# Patient Record
Sex: MALE | Race: WHITE
[De-identification: names, ages, dates, MRNs, and addresses within clinical notes are randomized per-mention and may not be internally consistent; named-entity substitution may affect disease eponyms.]

---

## 2017-01-28 ENCOUNTER — HOSPITAL ENCOUNTER (EMERGENCY)
Dept: HOSPITAL 17 - NEPA | Age: 27
Discharge: HOME | End: 2017-01-28
Payer: SELF-PAY

## 2017-01-28 VITALS
HEART RATE: 68 BPM | RESPIRATION RATE: 17 BRPM | OXYGEN SATURATION: 100 % | DIASTOLIC BLOOD PRESSURE: 57 MMHG | SYSTOLIC BLOOD PRESSURE: 108 MMHG

## 2017-01-28 VITALS
SYSTOLIC BLOOD PRESSURE: 131 MMHG | RESPIRATION RATE: 16 BRPM | TEMPERATURE: 98.4 F | HEART RATE: 87 BPM | OXYGEN SATURATION: 100 % | DIASTOLIC BLOOD PRESSURE: 80 MMHG

## 2017-01-28 VITALS — HEIGHT: 66 IN | WEIGHT: 143.3 LBS | BODY MASS INDEX: 23.03 KG/M2

## 2017-01-28 DIAGNOSIS — K29.01: Primary | ICD-10-CM

## 2017-01-28 LAB
ALP SERPL-CCNC: 85 U/L (ref 45–117)
ALT SERPL-CCNC: 187 U/L (ref 12–78)
ANION GAP SERPL CALC-SCNC: 6 MEQ/L (ref 5–15)
APTT BLD: 28.6 SEC (ref 24.3–30.1)
AST SERPL-CCNC: 41 U/L (ref 15–37)
BASOPHILS # BLD AUTO: 0.1 TH/MM3 (ref 0–0.2)
BASOPHILS NFR BLD: 1.7 % (ref 0–2)
BILIRUB SERPL-MCNC: 0.4 MG/DL (ref 0.2–1)
BUN SERPL-MCNC: 11 MG/DL (ref 7–18)
CHLORIDE SERPL-SCNC: 108 MEQ/L (ref 98–107)
EOSINOPHIL # BLD: 0 TH/MM3 (ref 0–0.4)
EOSINOPHIL NFR BLD: 0.5 % (ref 0–4)
ERYTHROCYTE [DISTWIDTH] IN BLOOD BY AUTOMATED COUNT: 13.4 % (ref 11.6–17.2)
GFR SERPLBLD BASED ON 1.73 SQ M-ARVRAT: 135 ML/MIN (ref 89–?)
HCO3 BLD-SCNC: 27.1 MEQ/L (ref 21–32)
HCT VFR BLD CALC: 40.8 % (ref 39–51)
HEMO FLAGS: (no result)
INR PPP: 1.1 RATIO
LYMPHOCYTES # BLD AUTO: 0.6 TH/MM3 (ref 1–4.8)
LYMPHOCYTES NFR BLD AUTO: 13 % (ref 9–44)
MCH RBC QN AUTO: 33.3 PG (ref 27–34)
MCHC RBC AUTO-ENTMCNC: 34.8 % (ref 32–36)
MCV RBC AUTO: 95.5 FL (ref 80–100)
MONOCYTES NFR BLD: 8 % (ref 0–8)
NEUTROPHILS # BLD AUTO: 3.6 TH/MM3 (ref 1.8–7.7)
NEUTROPHILS NFR BLD AUTO: 76.8 % (ref 16–70)
PLATELET # BLD: 127 TH/MM3 (ref 150–450)
POTASSIUM SERPL-SCNC: 4.2 MEQ/L (ref 3.5–5.1)
PROTHROMBIN TIME: 11.7 SEC (ref 9.8–11.6)
RBC # BLD AUTO: 4.27 MIL/MM3 (ref 4.5–5.9)
SODIUM SERPL-SCNC: 141 MEQ/L (ref 136–145)
WBC # BLD AUTO: 4.7 TH/MM3 (ref 4–11)

## 2017-01-28 PROCEDURE — C9113 INJ PANTOPRAZOLE SODIUM, VIA: HCPCS

## 2017-01-28 PROCEDURE — 96374 THER/PROPH/DIAG INJ IV PUSH: CPT

## 2017-01-28 PROCEDURE — 85025 COMPLETE CBC W/AUTO DIFF WBC: CPT

## 2017-01-28 PROCEDURE — 99284 EMERGENCY DEPT VISIT MOD MDM: CPT

## 2017-01-28 PROCEDURE — 74177 CT ABD & PELVIS W/CONTRAST: CPT

## 2017-01-28 PROCEDURE — 83690 ASSAY OF LIPASE: CPT

## 2017-01-28 PROCEDURE — 85730 THROMBOPLASTIN TIME PARTIAL: CPT

## 2017-01-28 PROCEDURE — 80053 COMPREHEN METABOLIC PANEL: CPT

## 2017-01-28 PROCEDURE — 96375 TX/PRO/DX INJ NEW DRUG ADDON: CPT

## 2017-01-28 PROCEDURE — 85610 PROTHROMBIN TIME: CPT

## 2017-01-28 RX ADMIN — SODIUM CHLORIDE, PRESERVATIVE FREE PRN ML: 5 INJECTION INTRAVENOUS at 12:31

## 2017-01-28 RX ADMIN — SODIUM CHLORIDE, PRESERVATIVE FREE PRN ML: 5 INJECTION INTRAVENOUS at 14:50

## 2017-01-28 NOTE — PD
HPI


Chief Complaint:  vomiting blood


Time Seen by Provider:  12:10


Travel History


International Travel<30 days:  No


Contact w/Intl Traveler<30days:  No


Traveled to known affect area:  No





History of Present Illness


HPI


26-year-old male with history of pancreatitis, bipolar disorder, status post 

appendectomy and cholecystectomy, presents to the ER today because he states 

that he has been having nausea, vomiting, vomiting small amounts of blood this 

morning.  He also complains of abdominal pain.  He denies any fevers, diarrhea, 

or any other issues.





Modifying Factors: None


Associated Signs & Symptoms: Nausea, vomiting, abdominal pain


Risk factors: Pancreatitis





PFSH


Past Medical History


ADHD:  Yes


Asthma:  No


Blood Disorders:  No


Bipolar Disorder:  Yes


Heart Rhythm Problems:  No


Cancer:  No


Cardiovascular Problems:  No


High Cholesterol:  No


Chemotherapy:  No


Chest Pain:  No


Congestive Heart Failure:  No


COPD:  No


Diabetes:  No


Diminished Hearing:  No


Gastrointestinal Disorders:  No


Genitourinary:  No


Hiatal Hernia:  Yes


Hypertension:  No


Immune Disorder:  No


Inguinal Hernia:  Yes


Implanted Vascular Access Dvce:  No


Musculoskeletal:  No


Neurologic:  No


Psychiatric:  Yes (HISTORY OF REPORTED BIPOLAR DISORDER)


Reproductive:  No


Respiratory:  No


Immunizations Current:  No


Pancreatitis:  Yes


Radiation Therapy:  No


Sleep Apnea:  No


Thyroid Disease:  No





Past Surgical History


Appendectomy:  Yes


Cholecystectomy:  Yes


Genitourinary Surgery:  Yes (HERNIA REPAIR)


Other Surgery:  Yes (APPENDECTOMY)





Social History


Alcohol Use:  Yes (bacardi gold)


Tobacco Use:  Yes (1/2 ppd)


Substance Use:  Yes (MARIJUANA)





Allergies-Medications


(Allergen,Severity, Reaction):  


Coded Allergies:  


     No Known Allergies (Unverified , 12/25/16)


Reported Meds & Prescriptions





Reported Meds & Active Scripts


Active


No Active Prescriptions or Reported Medications    








Review of Systems


Except as stated in HPI:  all other systems reviewed are Neg





Physical Exam


Narrative


GENERAL: Well-nourished, well-developed young male patient in mild distress.


SKIN: Warm and dry.


HEAD: Normocephalic.


EYES: No scleral icterus. No injection or drainage. 


NECK: Supple, trachea midline. 


CARDIOVASCULAR: Regular rate and rhythm without murmurs, gallops, or rubs. 


RESPIRATORY: Breath sounds equal bilaterally. No accessory muscle use.


GASTROINTESTINAL: Abdomen soft, diffuse abdominal tenderness without guarding 

or rebound, nondistended. 


MUSCULOSKELETAL: No cyanosis, or edema. 


BACK: Nontender without obvious deformity. No CVA tenderness.





Data


Data


Last Documented VS








Vital Signs








  Date Time  Temp Pulse Resp B/P Pulse Ox O2 Delivery O2 Flow Rate FiO2


 


1/28/17 15:33  68 17 108/57 100 Room Air  


 


1/28/17 12:14 98.4       











Orders





 Complete Blood Count With Diff (1/28/17 12:10)


Comprehensive Metabolic Panel (1/28/17 12:10)


Lipase (1/28/17 12:10)


Prothrombin Time / Inr (Pt) (1/28/17 12:10)


Act Partial Throm Time (Ptt) (1/28/17 12:10)


Ct Abd/Pel W Iv Contrast(Rout) (1/28/17 12:10)


Iv Access Insert/Monitor (1/28/17 12:10)


Ecg Monitoring (1/28/17 12:10)


Oximetry (1/28/17 12:10)


NPO (1/28/17 12:10)


Ondansetron Inj (Zofran Inj) (1/28/17 12:15)


Pantoprazole Inj (Protonix Inj) (1/28/17 12:15)


Sodium Chlor 0.9% 1000 Ml Inj (Ns 1000 M (1/28/17 12:10)


Sodium Chloride 0.9% Flush (Ns Flush) (1/28/17 12:15)


Morphine Inj (Morphine Inj) (1/28/17 13:00)





Labs








 Laboratory Tests








Test 1/28/17





 12:15


 


White Blood Count 4.7 TH/MM3


 


Red Blood Count 4.27 MIL/MM3


 


Hemoglobin 14.2 GM/DL


 


Hematocrit 40.8 %


 


Mean Corpuscular Volume 95.5 FL


 


Mean Corpuscular Hemoglobin 33.3 PG


 


Mean Corpuscular Hemoglobin 34.8 %





Concent 


 


Red Cell Distribution Width 13.4 %


 


Platelet Count 127 TH/MM3


 


Mean Platelet Volume 8.5 FL


 


Neutrophils (%) (Auto) 76.8 %


 


Lymphocytes (%) (Auto) 13.0 %


 


Monocytes (%) (Auto) 8.0 %


 


Eosinophils (%) (Auto) 0.5 %


 


Basophils (%) (Auto) 1.7 %


 


Neutrophils # (Auto) 3.6 TH/MM3


 


Lymphocytes # (Auto) 0.6 TH/MM3


 


Monocytes # (Auto) 0.4 TH/MM3


 


Eosinophils # (Auto) 0.0 TH/MM3


 


Basophils # (Auto) 0.1 TH/MM3


 


CBC Comment DIFF FINAL 


 


Differential Comment  


 


Prothrombin Time 11.7 SEC


 


Prothromb Time International 1.1 RATIO





Ratio 


 


Activated Partial 28.6 SEC





Thromboplast Time 


 


Sodium Level 141 MEQ/L


 


Potassium Level 4.2 MEQ/L


 


Chloride Level 108 MEQ/L


 


Carbon Dioxide Level 27.1 MEQ/L


 


Anion Gap 6 MEQ/L


 


Blood Urea Nitrogen 11 MG/DL


 


Creatinine 0.71 MG/DL


 


Estimat Glomerular Filtration 135 ML/MIN





Rate 


 


Random Glucose 90 MG/DL


 


Calcium Level 8.6 MG/DL


 


Total Bilirubin 0.4 MG/DL


 


Aspartate Amino Transf 41 U/L





(AST/SGOT) 


 


Alanine Aminotransferase 187 U/L





(ALT/SGPT) 


 


Alkaline Phosphatase 85 U/L


 


Total Protein 7.5 GM/DL


 


Albumin 4.1 GM/DL


 


Lipase 74 U/L














[facility]


Medical Decision Making


Medical Screen Exam Complete:  Yes


Emergency Medical Condition:  Yes


Medical Record Reviewed:  Yes


Interpretation(s)





Laboratory Tests








Test 1/28/17





 12:15


 


Red Blood Count 4.27 MIL/MM3





 (4.50-5.90)


 


Platelet Count 127 TH/MM3





 (150-450)


 


Neutrophils (%) (Auto) 76.8 %





 (16.0-70.0)


 


Lymphocytes # (Auto) 0.6 TH/MM3





 (1.0-4.8)


 


Prothrombin Time 11.7 SEC





 (9.8-11.6)


 


Chloride Level 108 MEQ/L





 ()


 


Aspartate Amino Transf 41 U/L (15-37)





(AST/SGOT) 


 


Alanine Aminotransferase 187 U/L (12-78)





(ALT/SGPT) 








Differential Diagnosis


Nausea, vomiting, abdominal pains, vomiting small amounts of bloodgastritis 

versus gastroenteritis versus pancreatitis versus GI bleed


Narrative Course


Lab work did not show significant signs of dehydration, anemia, or significant 

metabolic issues.  CAT scan did not show any signs of acute intra-abdominal 

processes.  At this point, suspect gastritis as the cause of current symptoms.  

Patient states that he vomited once up small amounts of blood at a time.  At 

this point, patient had been given IV fluids, anti-medics, pain medications, 

and is doing well in the ER on reevaluation at 3:30.  At this point, my plan 

would be to release the patient with follow-up to primary care doctor.  Return 

for any worsening in symptoms as necessary.  The plan was discussed with him 

and he states understanding.  He should return for any worsening in vomiting, 

bleeding, and as needed.





Diagnosis





 Primary Impression:  


 ACUTE GASTRITIS WITH BLEEDING


***Med/Other Pt SpecificInfo:  Prescription(s) given


Scripts


Ondansetron Odt (Zofran Odt)4 Mg Tab4 Mg SL Q6HR PRN (Nausea/Vomiting) #7 TAB  

Ref 0


   Prov:Dereje Fernandes MD         1/28/17 


Ranitidine (Zantac)150 Mg Mma995 Mg PO BID  #20 TAB  Ref 0


   Prov:Dereje Fernandes MD         1/28/17


Disposition:  01 DISCHARGE HOME


Condition:  Stable








Dereje Fernandes MD Jan 28, 2017 12:13

## 2017-01-28 NOTE — RADRPT
EXAM DATE/TIME:  01/28/2017 14:57 

 

[facility] COMPARISON:     

CT ABDOMEN & PELVIS W CONTRAST, June 05, 2015, 15:22.

 

 

INDICATIONS :     

Vomiting and abdominal pain for four days.

                      

 

IV CONTRAST:     

95 cc Omnipaque 350 (iohexol) IV 

 

 

ORAL CONTRAST:      

No oral contrast ingested.

                      

 

RADIATION DOSE:     

9.96 CTDIvol (mGy) 

 

 

MEDICAL HISTORY :     

Pancreatitis. Hernia, hiatal. Hernia, inguinal.

 

SURGICAL HISTORY :      

Appendectomy. Cholecystectomy.

 

ENCOUNTER:      

Initial

 

ACUITY:      

4 - 6 days

 

PAIN SCALE:      

7/10

 

LOCATION:         

abdomen/pelvis

 

TECHNIQUE:     

Volumetric scanning of the abdomen and pelvis was performed.  Using automated exposure control and ad
justment of the mA and/or kV according to patient size, radiation dose was kept as low as reasonably 
achievable to obtain optimal diagnostic quality images. 

 

FINDINGS:     

 

LOWER LUNGS:     

The visualized lower lungs are clear.

 

LIVER:     

Homogeneous density without lesion.  There is no dilation of the biliary tree.  The common bile duct 
measures 8 mm.  Hemoclips in the martin from prior cholecystectomy.

 

SPLEEN:     

Normal size without lesion.

 

PANCREAS:     

Within normal limits.

 

KIDNEYS:     

Normal in size and shape.  There is no mass, stone or hydronephrosis.

 

ADRENAL GLANDS:     

Within normal limits.

 

VASCULAR:     

There is no aortic aneurysm.

 

BOWEL/MESENTERY:     

No dilated loops of small large bowel.  A mild amount of stool in the right colon.  No evidence of fr
ee fluid.

 

ABDOMINAL WALL:     

Within normal limits.

 

RETROPERITONEUM:     

There is no lymphadenopathy. 

 

BLADDER:     

No wall thickening or mass. 

 

REPRODUCTIVE:     

Within normal limits.

 

INGUINAL:     

There is no lymphadenopathy or hernia. 

 

MUSCULOSKELETAL:     

Within normal limits for patient age. 

 

CONCLUSION:     

Negative CT abdomen/pelvis with contrast.  Prior cholecystectomy with the common bile duct upper limi
ts normal in dimension.

 

 

 

 Elliot Amanda MD on January 28, 2017 at 15:31           

Board Certified Radiologist.

 This report was verified electronically.

## 2017-04-25 ENCOUNTER — HOSPITAL ENCOUNTER (OUTPATIENT)
Dept: HOSPITAL 17 - NEPD | Age: 27
Setting detail: OBSERVATION
LOS: 3 days | Discharge: HOME | End: 2017-04-28
Attending: HOSPITALIST | Admitting: HOSPITALIST
Payer: SELF-PAY

## 2017-04-25 VITALS — BODY MASS INDEX: 21.45 KG/M2 | HEIGHT: 67 IN | WEIGHT: 136.69 LBS

## 2017-04-25 VITALS
SYSTOLIC BLOOD PRESSURE: 120 MMHG | RESPIRATION RATE: 16 BRPM | DIASTOLIC BLOOD PRESSURE: 75 MMHG | HEART RATE: 71 BPM | OXYGEN SATURATION: 98 %

## 2017-04-25 VITALS
DIASTOLIC BLOOD PRESSURE: 69 MMHG | SYSTOLIC BLOOD PRESSURE: 155 MMHG | RESPIRATION RATE: 15 BRPM | OXYGEN SATURATION: 100 % | TEMPERATURE: 97.9 F | HEART RATE: 73 BPM

## 2017-04-25 VITALS
RESPIRATION RATE: 16 BRPM | OXYGEN SATURATION: 98 % | HEART RATE: 64 BPM | SYSTOLIC BLOOD PRESSURE: 125 MMHG | DIASTOLIC BLOOD PRESSURE: 76 MMHG

## 2017-04-25 DIAGNOSIS — J02.9: ICD-10-CM

## 2017-04-25 DIAGNOSIS — R74.8: ICD-10-CM

## 2017-04-25 DIAGNOSIS — F90.9: ICD-10-CM

## 2017-04-25 DIAGNOSIS — F31.9: ICD-10-CM

## 2017-04-25 DIAGNOSIS — F17.200: ICD-10-CM

## 2017-04-25 DIAGNOSIS — Z90.49: ICD-10-CM

## 2017-04-25 DIAGNOSIS — R74.0: ICD-10-CM

## 2017-04-25 DIAGNOSIS — F10.10: ICD-10-CM

## 2017-04-25 DIAGNOSIS — K86.1: ICD-10-CM

## 2017-04-25 DIAGNOSIS — K29.50: Primary | ICD-10-CM

## 2017-04-25 LAB
ALP SERPL-CCNC: 83 U/L (ref 45–117)
ALT SERPL-CCNC: 99 U/L (ref 12–78)
ANION GAP SERPL CALC-SCNC: 7 MEQ/L (ref 5–15)
APTT BLD: 29 SEC (ref 24.3–30.1)
AST SERPL-CCNC: 167 U/L (ref 15–37)
BASOPHILS # BLD AUTO: 0 TH/MM3 (ref 0–0.2)
BASOPHILS NFR BLD: 0.4 % (ref 0–2)
BILIRUB SERPL-MCNC: 1.4 MG/DL (ref 0.2–1)
BUN SERPL-MCNC: 12 MG/DL (ref 7–18)
CHLORIDE SERPL-SCNC: 105 MEQ/L (ref 98–107)
EOSINOPHIL # BLD: 0 TH/MM3 (ref 0–0.4)
EOSINOPHIL NFR BLD: 0.5 % (ref 0–4)
ERYTHROCYTE [DISTWIDTH] IN BLOOD BY AUTOMATED COUNT: 13.2 % (ref 11.6–17.2)
GFR SERPLBLD BASED ON 1.73 SQ M-ARVRAT: 145 ML/MIN (ref 89–?)
HCO3 BLD-SCNC: 27.5 MEQ/L (ref 21–32)
HCT VFR BLD CALC: 40.9 % (ref 39–51)
HEMO FLAGS: (no result)
INR PPP: 1.1 RATIO
LYMPHOCYTES # BLD AUTO: 1.9 TH/MM3 (ref 1–4.8)
LYMPHOCYTES NFR BLD AUTO: 21.5 % (ref 9–44)
MAGNESIUM SERPL-MCNC: 2.1 MG/DL (ref 1.5–2.5)
MCH RBC QN AUTO: 31.8 PG (ref 27–34)
MCHC RBC AUTO-ENTMCNC: 33.5 % (ref 32–36)
MCV RBC AUTO: 94.7 FL (ref 80–100)
MONOCYTES NFR BLD: 12 % (ref 0–8)
NEUTROPHILS # BLD AUTO: 5.9 TH/MM3 (ref 1.8–7.7)
NEUTROPHILS NFR BLD AUTO: 65.6 % (ref 16–70)
PLATELET # BLD: 146 TH/MM3 (ref 150–450)
POTASSIUM SERPL-SCNC: 3.8 MEQ/L (ref 3.5–5.1)
PROTHROMBIN TIME: 12.2 SEC (ref 9.8–11.6)
RBC # BLD AUTO: 4.32 MIL/MM3 (ref 4.5–5.9)
SODIUM SERPL-SCNC: 139 MEQ/L (ref 136–145)
WBC # BLD AUTO: 9 TH/MM3 (ref 4–11)

## 2017-04-25 PROCEDURE — 84484 ASSAY OF TROPONIN QUANT: CPT

## 2017-04-25 PROCEDURE — 80074 ACUTE HEPATITIS PANEL: CPT

## 2017-04-25 PROCEDURE — 96374 THER/PROPH/DIAG INJ IV PUSH: CPT

## 2017-04-25 PROCEDURE — 83690 ASSAY OF LIPASE: CPT

## 2017-04-25 PROCEDURE — 88305 TISSUE EXAM BY PATHOLOGIST: CPT

## 2017-04-25 PROCEDURE — G0378 HOSPITAL OBSERVATION PER HR: HCPCS

## 2017-04-25 PROCEDURE — 80076 HEPATIC FUNCTION PANEL: CPT

## 2017-04-25 PROCEDURE — 80307 DRUG TEST PRSMV CHEM ANLYZR: CPT

## 2017-04-25 PROCEDURE — 85730 THROMBOPLASTIN TIME PARTIAL: CPT

## 2017-04-25 PROCEDURE — C9113 INJ PANTOPRAZOLE SODIUM, VIA: HCPCS

## 2017-04-25 PROCEDURE — 71010: CPT

## 2017-04-25 PROCEDURE — 88312 SPECIAL STAINS GROUP 1: CPT

## 2017-04-25 PROCEDURE — 99285 EMERGENCY DEPT VISIT HI MDM: CPT

## 2017-04-25 PROCEDURE — 74177 CT ABD & PELVIS W/CONTRAST: CPT

## 2017-04-25 PROCEDURE — 80053 COMPREHEN METABOLIC PANEL: CPT

## 2017-04-25 PROCEDURE — 96375 TX/PRO/DX INJ NEW DRUG ADDON: CPT

## 2017-04-25 PROCEDURE — 96361 HYDRATE IV INFUSION ADD-ON: CPT

## 2017-04-25 PROCEDURE — 43239 EGD BIOPSY SINGLE/MULTIPLE: CPT

## 2017-04-25 PROCEDURE — 85025 COMPLETE CBC W/AUTO DIFF WBC: CPT

## 2017-04-25 PROCEDURE — 85610 PROTHROMBIN TIME: CPT

## 2017-04-25 PROCEDURE — 83735 ASSAY OF MAGNESIUM: CPT

## 2017-04-25 RX ADMIN — NICOTINE SCH PATCH: 14 PATCH, EXTENDED RELEASE TOPICAL at 21:39

## 2017-04-25 RX ADMIN — PHENYTOIN SODIUM SCH MLS/HR: 50 INJECTION INTRAMUSCULAR; INTRAVENOUS at 20:23

## 2017-04-25 RX ADMIN — HYDROMORPHONE HYDROCHLORIDE PRN MG: 1 INJECTION, SOLUTION INTRAMUSCULAR; INTRAVENOUS; SUBCUTANEOUS at 21:18

## 2017-04-25 RX ADMIN — Medication SCH ML: at 20:51

## 2017-04-25 NOTE — HHI.HP
__________________________________________________





HPI


Service


[facility]


Primary Care Physician


No Primary Care Physician


Admission Diagnosis


Pancreatitis


Diagnoses:  


Chief Complaint:  


abdominal pain


Travel History


International Travel<30 Days:  No


Contact w/Intl Traveler <30 Da:  No


Traveled to Known Affected Are:  No


History of Present Illness


This is a 26 year old male patient with a past medical history which includes 

ADHD, bipolar, gastritis and chronic pancreatitis.  Patient was in his normal 

state of anuja around 11:30 he was walking and ran over the train tracks.  

Bety in the day around 1200pm patient began to have severe epigastric pain 

which radiated to his chest associated with intermitted nausea and dizziness 

without vomiting.  Patient also reports subjective intermitted fevers x 1 day 

with associated hot and cold flashes.  Reports constipated last BM 1.5 days 

ago.  Patient denies dysuria, penile discharge, open sores, shortness of breath

, black tarry stools or BRBPR.  


Patient reports he rarely drinks alcohol last drink was 2-3 days ago.  Patient 

also reports that this feels similar to his prior pancreatitis episodes but 

feels as though this is worse than before.  Pain improved with IV morphine.





Review of Systems


Except as stated in HPI:  all other systems reviewed are Neg





Past Family Social History


Past Medical History


AHHD, bipolar, gastritis and chronic pancreatitis


Past Surgical History


appendectomy, hernia repair as a baby, cholecystectomy


Reported Medications


denies taking medications on a daily bases


Allergies:  


Coded Allergies:  


     No Known Allergies (Unverified , 4/25/17)


Active Ordered Medications





 Current Medications








 Medications


  (Trade)  Dose


 Ordered  Sig/Alex


 Route  Start Time


 Stop Time Status Last Admin


 


  (NS 1000 ml Inj)  1,000 ml @ 


 250 mls/hr  Q4H


 IV  4/25/17 19:57


    4/25/17 20:23


 


 


  (NS Flush)  2 ml  UNSCH  PRN


 IV FLUSH  4/25/17 20:00


     


 


 


  (NS Flush)  2 ml  BID


 IV FLUSH  4/25/17 21:00


     


 


 


  (Zofran Inj)  4 mg  Q6H  PRN


 IVP  4/25/17 20:00


     


 


 


  (Narcan Inj)  0.4 mg  UNSCH  PRN


 IV  4/25/17 20:00


     


 


 


  (Dilaudid Pf Inj)  1 mg  Q3HR  PRN


 IV PUSH  4/25/17 20:15


     


 








Family History


Mother had an MI at age 50


Social History


ETOH occasionally on special occasions- last drink 2-3 days ago


tobacco use 0.5 PPD


marijuana occasionally





Physical Exam


Vital Signs





 Vital Signs








  Date Time  Temp Pulse Resp B/P Pulse Ox O2 Delivery O2 Flow Rate FiO2


 


4/25/17 19:45  71 16 120/75 98 Room Air  


 


4/25/17 18:13     98 Room Air  


 


4/25/17 18:13  64 16 125/76 98 Room Air  


 


4/25/17 18:13      Room Air  


 


4/25/17 17:26 97.9 73 15 155/69 100   








Physical Exam


GENERAL: This is a well-nourished, well-developed patient, appears 

uncomfortable but in no apparent distress.


SKIN: No rashes, ecchymoses or lesions. Cool and dry.


HEAD: Atraumatic. Normocephalic. No temporal or scalp tenderness.


EYES: Extraocular motions intact. No scleral icterus. No injection or drainage. 


CARDIOVASCULAR: Regular rate and rhythm without murmurs, gallops, or rubs. 


RESPIRATORY: Clear to auscultation. Breath sounds equal bilaterally. No wheezes

, rales, or rhonchi.  


GASTROINTESTINAL: Abdomen soft, tender to light palpation, nondistended. 


MUSCULOSKELETAL: Extremities without clubbing, cyanosis, or edema. No joint 

tenderness, effusion, or edema noted. No calf tenderness. Negative Homans sign 

bilaterally.


NEUROLOGICAL: Awake and alert.  Motor and sensory grossly within normal limits. 

Five out of 5 muscle strength in all muscle groups.  Normal speech.


Laboratory





Laboratory Tests








Test 4/25/17 4/25/17





 17:25 17:52


 


Prothrombin Time 12.2  


 


Prothromb Time International 1.1  





Ratio  


 


Activated Partial 29.0  





Thromboplast Time  


 


White Blood Count  9.0 


 


Red Blood Count  4.32 


 


Hemoglobin  13.7 


 


Hematocrit  40.9 


 


Mean Corpuscular Volume  94.7 


 


Mean Corpuscular Hemoglobin  31.8 


 


Mean Corpuscular Hemoglobin  33.5 





Concent  


 


Red Cell Distribution Width  13.2 


 


Platelet Count  146 


 


Mean Platelet Volume  8.3 


 


Neutrophils (%) (Auto)  65.6 


 


Lymphocytes (%) (Auto)  21.5 


 


Monocytes (%) (Auto)  12.0 


 


Eosinophils (%) (Auto)  0.5 


 


Basophils (%) (Auto)  0.4 


 


Neutrophils # (Auto)  5.9 


 


Lymphocytes # (Auto)  1.9 


 


Monocytes # (Auto)  1.1 


 


Eosinophils # (Auto)  0.0 


 


Basophils # (Auto)  0.0 


 


CBC Comment  DIFF FINAL 


 


Differential Comment   


 


Sodium Level  139 


 


Potassium Level  3.8 


 


Chloride Level  105 


 


Carbon Dioxide Level  27.5 


 


Anion Gap  7 


 


Blood Urea Nitrogen  12 


 


Creatinine  0.67 


 


Estimat Glomerular Filtration  145 





Rate  


 


Random Glucose  93 


 


Calcium Level  8.6 


 


Magnesium Level  2.1 


 


Total Bilirubin  1.4 


 


Aspartate Amino Transf  167 





(AST/SGOT)  


 


Alanine Aminotransferase  99 





(ALT/SGPT)  


 


Alkaline Phosphatase  83 


 


Troponin I  LESS THAN 0.02 


 


Total Protein  7.0 


 


Albumin  4.2 


 


Lipase  874 








Result Diagram:  


4/25/17 1752                                                                   

             4/25/17 1752





Imaging





Last Impressions








Chest X-Ray 4/25/17 1737 Signed





Impressions: 





 Service Date/Time:  Tuesday, April 25, 2017 17:59 - CONCLUSION: No acute 





 disease.       Chau Merino MD 


 


Abdomen/Pelvis CT 4/25/17 0000 Signed





Impressions: 





 Service Date/Time:  Tuesday, April 25, 2017 18:23 - CONCLUSION:  1. Status 

post 





 cholecystectomy. 2. No acute inflammatory process. 3. Stable subcentimeter 





 hepatic low-density, likely benign.     Chau Merino MD 











Assessment and Plan


Assessment and Plan


This is a 26 year old male patient with a past medical history which includes 

ADHD, bipolar, gastritis and chronic pancreatitis.  Patient was in his normal 

state of anuja around 11:30 this morning patient was in his normal state of 

health and recalls taking a walk and running over the train tracks.  Bety in 

the day around 1200pm patient began to have severe epigastric pain which 

radiated to his chest associated with intermitted nausea and dizziness without 

vomiting.  Has had subjective intermitted fevers x 1 day with associated hot 

and cold flashes.  Reports constipated last BM 1.5 days ago.  Patient denies 

dysuria, penile discharge, open sores





acute exacerbation of chronic Pancreatitis, elevated lipase 874


   clear liquids


   IV fluids


   Dilaudid IV for pain management


   CT abdomen/pelvis reviewed by myself and Dr. Fuentes reveals:  Status post 

cholecystectomy. No acute inflammatory process. Stable subcentimeter hepatic low

-density, likely 


      benign.  


   


transaminitis (total bilirubin 1.4 , and ALT 99)


   clear liquids


    IV fluids 


   counselled on ETOH abuse


   recheck CMP in AM





Tobacco abuse


   Counselled encouraged to abstain


   nicotine patch





DVT prophylaxis SCDs





discussed with ER provider, nursing and patient


Written by Shy Juan, acting as scribe for Dr. Fuentes on 4/25/17 at 20:

49. 





This note was transcribed by scribe [Shy Juan].  I, Dr. Edlon Fuentes personally performed the history, physical exam, and medical decision 

making; and confirmed the accuracy of the information in the transcribed note.





Authenticated by Dr. Eldon Fuentes on 4/25/17 at 20:49.








Shy Juan Apr 25, 2017 20:52


Eldon Fuentes MD May 1, 2017 06:00

## 2017-04-25 NOTE — PD
HPI


Chief Complaint:  Chest Pain


Time Seen by Provider:  17:35


Travel History


International Travel<30 days:  No


Contact w/Intl Traveler<30days:  No


Traveled to known affect area:  No





History of Present Illness


HPI


26-year-old male presents to the emergency department with epigastric pain 

radiating up into his chest.  Patient states she's had similar symptoms like 

this in the past but never this severe.  Patient states that he has a history 

of chronic pancreatitis.  He relates a history that he was crossing a railroad 

tracks and had to hurry up because the train was coming.  He says shortly after 

that he experienced the pain which resolved when he was sitting still.  But 

then came back and his associated with severe nausea without vomiting.  Patient 

states the pain is intense at this time and he is quite uncomfortable appearing.





PFSH


Past Medical History


Hx Anticoagulant Therapy:  No


ADHD:  Yes


Asthma:  No


Blood Disorders:  No


Bipolar Disorder:  Yes


Heart Rhythm Problems:  No


Cancer:  No


Cardiovascular Problems:  No


High Cholesterol:  No


Chemotherapy:  No


Chest Pain:  No


Congestive Heart Failure:  No


COPD:  No


Cerebrovascular Accident:  No


Diabetes:  No


Diminished Hearing:  No


Gastrointestinal Disorders:  No


Genitourinary:  No


Hiatal Hernia:  Yes


Hypertension:  No


Immune Disorder:  No


Inguinal Hernia:  Yes


Implanted Vascular Access Dvce:  No


Musculoskeletal:  No


Neurologic:  No


Psychiatric:  Yes (HISTORY OF REPORTED BIPOLAR DISORDER)


Reproductive:  No


Respiratory:  No


Immunizations Current:  No


Pancreatitis:  Yes


Radiation Therapy:  No


Sleep Apnea:  No


Thyroid Disease:  No





Past Surgical History


Appendectomy:  Yes


Cholecystectomy:  Yes


Genitourinary Surgery:  Yes (HERNIA REPAIR)


Hysterectomy:  No


Other Surgery:  Yes (APPENDECTOMY)





Social History


Alcohol Use:  Yes (bacardi gold)


Tobacco Use:  Yes (1/2 ppd)


Substance Use:  Yes (MARIJUANA)





Allergies-Medications


(Allergen,Severity, Reaction):  


Coded Allergies:  


     No Known Allergies (Unverified , 4/25/17)


Reported Meds & Prescriptions





Reported Meds & Active Scripts


Active








Review of Systems


Except as stated in HPI:  all other systems reviewed are Neg





Physical Exam


Narrative


GENERAL: Well-developed well-nourished no apparent distress


SKIN: Focused skin assessment warm/dry.


HEAD: Atraumatic. Normocephalic. 


EYES: Pupils equal and round. No scleral icterus. No injection or drainage. 


ENT: No nasal bleeding or discharge.  Mucous membranes pink and moist.


NECK: Trachea midline. No JVD. 


CARDIOVASCULAR: Regular rate and rhythm.  No murmur appreciated.


RESPIRATORY: No accessory muscle use. Clear to auscultation. Breath sounds 

equal bilaterally. 


GASTROINTESTINAL: Abdomen soft, moderately tender in the epigastric area, 

nondistended. Hepatic and splenic margins not palpable.  Voluntary guarding.  

No rebound no percussive tenderness.


MUSCULOSKELETAL: No obvious deformities. No clubbing.  No cyanosis.  No edema. 


NEUROLOGICAL: Awake and alert. No obvious cranial nerve deficits.  Motor 

grossly within normal limits. Normal speech.


PSYCHIATRIC: Appropriate mood and affect; insight and judgment normal.





Data


Data


Last Documented VS





Vital Signs








  Date Time  Temp Pulse Resp B/P Pulse Ox O2 Delivery O2 Flow Rate FiO2


 


4/25/17 19:45  71 16 120/75 98 Room Air  


 


4/25/17 17:26 97.9       








Orders





 Complete Blood Count With Diff (4/25/17 17:37)


Comprehensive Metabolic Panel (4/25/17 17:37)


Magnesium (Mg) (4/25/17 17:37)


Prothrombin Time / Inr (Pt) (4/25/17 17:37)


Act Partial Throm Time (Ptt) (4/25/17 17:37)


Troponin I (4/25/17 17:37)


Lipase (4/25/17 17:37)


Chest, Single Ap (4/25/17 17:37)


Ecg Monitoring (4/25/17 17:37)


Iv Access Insert/Monitor (4/25/17 17:37)


Oximetry (4/25/17 17:37)


Oxygen Administration (4/25/17 17:37)


Sodium Chloride 0.9% Flush (Ns Flush) (4/25/17 17:45)


Morphine Inj (Morphine Inj) (4/25/17 18:00)


Ondansetron Inj (Zofran Inj) (4/25/17 18:00)


Sodium Chlor 0.9% 1000 Ml Inj (Ns 1000 M (4/25/17 18:00)


Ct Abd/Pel W Iv Contrast(Rout) (4/25/17 )


Iohexol 350 Inj (Omnipaque 350 Inj) (4/25/17 18:40)


Morphine Inj (Morphine Inj) (4/25/17 20:00)


Admit Order (Ed Use Only) (4/25/17 )





Labs





 Laboratory Tests








Test 4/25/17 4/25/17





 17:25 17:52


 


Prothrombin Time 12.2 SEC 


 


Prothromb Time International 1.1 RATIO 





Ratio  


 


Activated Partial 29.0 SEC 





Thromboplast Time  


 


White Blood Count  9.0 TH/MM3


 


Red Blood Count  4.32 MIL/MM3


 


Hemoglobin  13.7 GM/DL


 


Hematocrit  40.9 %


 


Mean Corpuscular Volume  94.7 FL


 


Mean Corpuscular Hemoglobin  31.8 PG


 


Mean Corpuscular Hemoglobin  33.5 %





Concent  


 


Red Cell Distribution Width  13.2 %


 


Platelet Count  146 TH/MM3


 


Mean Platelet Volume  8.3 FL


 


Neutrophils (%) (Auto)  65.6 %


 


Lymphocytes (%) (Auto)  21.5 %


 


Monocytes (%) (Auto)  12.0 %


 


Eosinophils (%) (Auto)  0.5 %


 


Basophils (%) (Auto)  0.4 %


 


Neutrophils # (Auto)  5.9 TH/MM3


 


Lymphocytes # (Auto)  1.9 TH/MM3


 


Monocytes # (Auto)  1.1 TH/MM3


 


Eosinophils # (Auto)  0.0 TH/MM3


 


Basophils # (Auto)  0.0 TH/MM3


 


CBC Comment  DIFF FINAL 


 


Differential Comment   


 


Sodium Level  139 MEQ/L


 


Potassium Level  3.8 MEQ/L


 


Chloride Level  105 MEQ/L


 


Carbon Dioxide Level  27.5 MEQ/L


 


Anion Gap  7 MEQ/L


 


Blood Urea Nitrogen  12 MG/DL


 


Creatinine  0.67 MG/DL


 


Estimat Glomerular Filtration  145 ML/MIN





Rate  


 


Random Glucose  93 MG/DL


 


Calcium Level  8.6 MG/DL


 


Magnesium Level  2.1 MG/DL


 


Total Bilirubin  1.4 MG/DL


 


Aspartate Amino Transf  167 U/L





(AST/SGOT)  


 


Alanine Aminotransferase  99 U/L





(ALT/SGPT)  


 


Alkaline Phosphatase  83 U/L


 


Troponin I  LESS THAN 0.02





  NG/ML


 


Total Protein  7.0 GM/DL


 


Albumin  4.2 GM/DL


 


Lipase  874 U/L











[facility]


Medical Decision Making


Medical Screen Exam Complete:  Yes


Emergency Medical Condition:  Yes


Differential Diagnosis


Pancreatitis, gastritis, gastritis, ACS unlikely.


Narrative Course


Patient 26-year-old male with a history of recurrent pancreatitis status post 

cholecystectomy presents with signs symptoms concerning for recurrent 

pancreatitis.  His lipase is elevated to in excess of 800.  He is quite 

uncomfortable in appearance.  He is been giving morphine fluids and Zofran I 

highly doubt that this patient is going to respond in the emergency department 

and likely will need admission.





Patient given morphine and starting to feel somewhat better.  He is thirsty.  

Discussed with him needs to keep nothing by mouth for the time being.  He does 

appear quite uncomfortable.  CAT scan was obtained which was negative.  

Additional dose of morphine was given.  Patient initially reluctant to stay and 

I discussed that he could consider outpatient management but likely he may get 

sicker and require admission for dehydration if he is unable tolerate by mouth 

fluids after this discussion the patient like to stay at least overnight in the 

hospital and I think this is appropriate given his level of discomfort that he 

displays.  Patient was discussed with Dr. Jones for admission.





Diagnosis





 Primary Impression:  


 Pancreatitis


 Qualified Code:  K85.90 - Acute pancreatitis, unspecified complication status, 

unspecified pancreatitis type





Admitting Information


Admitting Physician Requests:  Observation


Condition:  Stable








Taiwo Duran MD Apr 25, 2017 18:52

## 2017-04-25 NOTE — PD
Physical Exam


Date Seen by Provider:  Apr 25, 2017


Time Seen by Provider:  17:26


Narrative


Pt presents with chest and stomach pain, this started at 1300 today. Pt has a 

history of pancreatitis. Pt reports nausea, dizziness, no vomiting. Pt reports 

10/10 pain. Pt and girlfriend were walking around noon and 20 minutes into the 

walk he developed epigastric pain that radiates to chest and lower abdomen. VSS





[facility]


Supervised Visit with ORIN:  Maryann Knpap Apr 25, 2017 17:29

## 2017-04-26 VITALS
RESPIRATION RATE: 20 BRPM | SYSTOLIC BLOOD PRESSURE: 138 MMHG | HEART RATE: 97 BPM | DIASTOLIC BLOOD PRESSURE: 95 MMHG | TEMPERATURE: 98.3 F

## 2017-04-26 VITALS
RESPIRATION RATE: 20 BRPM | SYSTOLIC BLOOD PRESSURE: 128 MMHG | HEART RATE: 76 BPM | TEMPERATURE: 97.7 F | OXYGEN SATURATION: 99 % | DIASTOLIC BLOOD PRESSURE: 72 MMHG

## 2017-04-26 VITALS
OXYGEN SATURATION: 96 % | DIASTOLIC BLOOD PRESSURE: 84 MMHG | RESPIRATION RATE: 18 BRPM | TEMPERATURE: 98.4 F | SYSTOLIC BLOOD PRESSURE: 135 MMHG | HEART RATE: 68 BPM

## 2017-04-26 VITALS
TEMPERATURE: 98.7 F | HEART RATE: 81 BPM | SYSTOLIC BLOOD PRESSURE: 121 MMHG | RESPIRATION RATE: 18 BRPM | DIASTOLIC BLOOD PRESSURE: 70 MMHG | OXYGEN SATURATION: 98 %

## 2017-04-26 VITALS
DIASTOLIC BLOOD PRESSURE: 85 MMHG | TEMPERATURE: 97.9 F | OXYGEN SATURATION: 97 % | HEART RATE: 71 BPM | SYSTOLIC BLOOD PRESSURE: 130 MMHG | RESPIRATION RATE: 21 BRPM

## 2017-04-26 VITALS
HEART RATE: 72 BPM | OXYGEN SATURATION: 95 % | RESPIRATION RATE: 20 BRPM | SYSTOLIC BLOOD PRESSURE: 134 MMHG | DIASTOLIC BLOOD PRESSURE: 80 MMHG

## 2017-04-26 VITALS — HEART RATE: 60 BPM

## 2017-04-26 LAB
ALP SERPL-CCNC: 109 U/L (ref 45–117)
ALT SERPL-CCNC: 219 U/L (ref 12–78)
AMPHETAMINE, URINE: (no result)
ANION GAP SERPL CALC-SCNC: 10 MEQ/L (ref 5–15)
AST SERPL-CCNC: 227 U/L (ref 15–37)
BARBITURATES, URINE: (no result)
BASOPHILS # BLD AUTO: 0 TH/MM3 (ref 0–0.2)
BASOPHILS NFR BLD: 0.3 % (ref 0–2)
BILIRUB SERPL-MCNC: 2.5 MG/DL (ref 0.2–1)
BUN SERPL-MCNC: 10 MG/DL (ref 7–18)
CHLORIDE SERPL-SCNC: 108 MEQ/L (ref 98–107)
COCAINE UR-MCNC: (no result) NG/ML
EOSINOPHIL # BLD: 0 TH/MM3 (ref 0–0.4)
EOSINOPHIL NFR BLD: 0.6 % (ref 0–4)
ERYTHROCYTE [DISTWIDTH] IN BLOOD BY AUTOMATED COUNT: 13 % (ref 11.6–17.2)
GFR SERPLBLD BASED ON 1.73 SQ M-ARVRAT: 143 ML/MIN (ref 89–?)
HCO3 BLD-SCNC: 23.3 MEQ/L (ref 21–32)
HCT VFR BLD CALC: 39.6 % (ref 39–51)
HEMO FLAGS: (no result)
LYMPHOCYTES # BLD AUTO: 1.1 TH/MM3 (ref 1–4.8)
LYMPHOCYTES NFR BLD AUTO: 20.7 % (ref 9–44)
MCH RBC QN AUTO: 31.9 PG (ref 27–34)
MCHC RBC AUTO-ENTMCNC: 33.3 % (ref 32–36)
MCV RBC AUTO: 95.6 FL (ref 80–100)
MONOCYTES NFR BLD: 8.7 % (ref 0–8)
NEUTROPHILS # BLD AUTO: 3.8 TH/MM3 (ref 1.8–7.7)
NEUTROPHILS NFR BLD AUTO: 69.7 % (ref 16–70)
PLATELET # BLD: 127 TH/MM3 (ref 150–450)
POTASSIUM SERPL-SCNC: 3.9 MEQ/L (ref 3.5–5.1)
RBC # BLD AUTO: 4.14 MIL/MM3 (ref 4.5–5.9)
SODIUM SERPL-SCNC: 141 MEQ/L (ref 136–145)
WBC # BLD AUTO: 5.4 TH/MM3 (ref 4–11)

## 2017-04-26 RX ADMIN — HYDROMORPHONE HYDROCHLORIDE PRN MG: 1 INJECTION, SOLUTION INTRAMUSCULAR; INTRAVENOUS; SUBCUTANEOUS at 09:10

## 2017-04-26 RX ADMIN — PHENYTOIN SODIUM SCH MLS/HR: 50 INJECTION INTRAMUSCULAR; INTRAVENOUS at 21:43

## 2017-04-26 RX ADMIN — PHENYTOIN SODIUM SCH MLS/HR: 50 INJECTION INTRAMUSCULAR; INTRAVENOUS at 23:57

## 2017-04-26 RX ADMIN — HYDROMORPHONE HYDROCHLORIDE PRN MG: 1 INJECTION, SOLUTION INTRAMUSCULAR; INTRAVENOUS; SUBCUTANEOUS at 00:29

## 2017-04-26 RX ADMIN — PHENYTOIN SODIUM SCH MLS/HR: 50 INJECTION INTRAMUSCULAR; INTRAVENOUS at 00:29

## 2017-04-26 RX ADMIN — Medication SCH ML: at 19:55

## 2017-04-26 RX ADMIN — ONDANSETRON PRN MG: 2 INJECTION, SOLUTION INTRAMUSCULAR; INTRAVENOUS at 02:55

## 2017-04-26 RX ADMIN — HYDROMORPHONE HYDROCHLORIDE PRN MG: 1 INJECTION, SOLUTION INTRAMUSCULAR; INTRAVENOUS; SUBCUTANEOUS at 04:15

## 2017-04-26 RX ADMIN — ONDANSETRON PRN MG: 2 INJECTION, SOLUTION INTRAMUSCULAR; INTRAVENOUS at 09:09

## 2017-04-26 RX ADMIN — HYDROMORPHONE HYDROCHLORIDE PRN MG: 1 INJECTION, SOLUTION INTRAMUSCULAR; INTRAVENOUS; SUBCUTANEOUS at 18:38

## 2017-04-26 RX ADMIN — HYDROMORPHONE HYDROCHLORIDE PRN MG: 1 INJECTION, SOLUTION INTRAMUSCULAR; INTRAVENOUS; SUBCUTANEOUS at 21:42

## 2017-04-26 RX ADMIN — Medication SCH ML: at 09:00

## 2017-04-26 RX ADMIN — HYDROMORPHONE HYDROCHLORIDE PRN MG: 1 INJECTION, SOLUTION INTRAMUSCULAR; INTRAVENOUS; SUBCUTANEOUS at 13:12

## 2017-04-26 RX ADMIN — PANTOPRAZOLE SODIUM SCH MG: 40 INJECTION, POWDER, FOR SOLUTION INTRAVENOUS at 18:42

## 2017-04-26 RX ADMIN — PHENYTOIN SODIUM SCH MLS/HR: 50 INJECTION INTRAMUSCULAR; INTRAVENOUS at 13:15

## 2017-04-26 RX ADMIN — ONDANSETRON PRN MG: 2 INJECTION, SOLUTION INTRAMUSCULAR; INTRAVENOUS at 18:38

## 2017-04-26 RX ADMIN — NICOTINE SCH PATCH: 14 PATCH, EXTENDED RELEASE TOPICAL at 09:00

## 2017-04-26 RX ADMIN — PHENYTOIN SODIUM SCH MLS/HR: 50 INJECTION INTRAMUSCULAR; INTRAVENOUS at 02:55

## 2017-04-26 RX ADMIN — PHENYTOIN SODIUM SCH MLS/HR: 50 INJECTION INTRAMUSCULAR; INTRAVENOUS at 18:39

## 2017-04-26 RX ADMIN — PHENYTOIN SODIUM SCH MLS/HR: 50 INJECTION INTRAMUSCULAR; INTRAVENOUS at 09:10

## 2017-04-26 NOTE — HHI.PR
Subjective


Remarks


Follow up abdominal pain. Patient is having a lot of abdominal pain, mainly on 

the right. States he was up all night vomiting, brown liquid. Patient states he 

only smokes 1/2 PPD and denies much alcohol use. He denies any chest pain or 

sob.





Objective


Vitals





 Vital Signs








  Date Time  Temp Pulse Resp B/P Pulse Ox O2 Delivery O2 Flow Rate FiO2


 


4/26/17 07:31 97.7 76 20 128/72 99   


 


4/26/17 04:04 97.9 71 21 130/85 97   


 


4/26/17 02:23  60      


 


4/26/17 00:48  72 20 134/80 95   


 


4/25/17 21:39   16     


 


4/25/17 20:51   16     


 


4/25/17 19:45  71 16 120/75 98 Room Air  


 


4/25/17 19:00   16     


 


4/25/17 18:13     98 Room Air  


 


4/25/17 18:13  64 16 125/76 98 Room Air  


 


4/25/17 18:13      Room Air  


 


4/25/17 17:26 97.9 73 15 155/69 100   








Result Diagram:  


4/26/17 0614                                                                   

             4/26/17 0614





Imaging





Last Impressions








Chest X-Ray 4/25/17 1737 Signed





Impressions: 





 Service Date/Time:  Tuesday, April 25, 2017 17:59 - CONCLUSION: No acute 





 disease.       Chau Merino MD 


 


Abdomen/Pelvis CT 4/25/17 0000 Signed





Impressions: 





 Service Date/Time:  Tuesday, April 25, 2017 18:23 - CONCLUSION:  1. Status 

post 





 cholecystectomy. 2. No acute inflammatory process. 3. Stable subcentimeter 





 hepatic low-density, likely benign.     Chau Merino MD 








Objective Remarks


GENERAL: This is a well-nourished, well-developed patient, appears tired and in 

pain


SKIN: No rashes, ecchymoses or lesions. Cool and dry.


HEAD: Atraumatic. Normocephalic. No temporal or scalp tenderness.


EYES: Extraocular motions intact. No scleral icterus. No injection or drainage. 


CARDIOVASCULAR: Regular rate and rhythm without murmurs, gallops, or rubs. 


RESPIRATORY: Clear to auscultation. Breath sounds equal bilaterally. No wheezes

, rales, or rhonchi.  


GASTROINTESTINAL: Abdomen soft, tender to palpation, nondistended. 


MUSCULOSKELETAL: Extremities without clubbing, cyanosis, or edema. No joint 

tenderness, effusion, or edema noted. No calf tenderness. 


NEUROLOGICAL: Awake and alert.  Motor and sensory grossly within normal limits. 

Five out of 5 muscle strength in all muscle groups.  Normal speech.


Medications and IVs





 Current Medications








 Medications


  (Trade)  Dose


 Ordered  Sig/Alex


 Route  Start Time


 Stop Time Status Last Admin


 


  (NS 1000 ml Inj)  1,000 ml @ 


 250 mls/hr  Q4H


 IV  4/25/17 19:57


    4/26/17 02:55


 


 


  (NS Flush)  2 ml  UNSCH  PRN


 IV FLUSH  4/25/17 20:00


     


 


 


  (NS Flush)  2 ml  BID


 IV FLUSH  4/25/17 21:00


     


 


 


  (Zofran Inj)  4 mg  Q6H  PRN


 IVP  4/25/17 20:00


    4/26/17 02:55


 


 


  (Narcan Inj)  0.4 mg  UNSCH  PRN


 IV  4/25/17 20:00


     


 


 


  (Dilaudid Pf Inj)  1 mg  Q3HR  PRN


 IV PUSH  4/25/17 20:15


    4/26/17 04:15


 


 


  (Habitrol 14 Mg


 Patch.24 Hr)  1 patch  DAILY


 T-DERMAL  4/25/17 21:30


    4/25/17 21:39


 


 


 Miscellaneous


 Information  1  DAILY


 T-DERMAL  4/26/17 09:00


     


 











A/P


Problem List:  


(1) Pancreatitis


ICD Code:  K85.9


Status:  Acute


Assessment and Plan


This is a 26 year old male patient with a past medical history which includes 

ADHD, bipolar, gastritis and chronic pancreatitis.  Patient was in his normal 

state of anuja around 11:30 this morning patient was in his normal state of 

health and recalls taking a walk and running over the train tracks.  Bety in 

the day around 1200pm patient began to have severe epigastric pain which 

radiated to his chest associated with intermitted nausea and dizziness without 

vomiting.  Has had subjective intermitted fevers x 1 day with associated hot 

and cold flashes.  Reports constipated last BM 1.5 days ago.  Patient denies 

dysuria, penile discharge, open sores





Acute exacerbation of chronic Pancreatitis, elevated lipase 874


CT abdomen/pelvis: Status post cholecystectomy. No acute inflammatory process. 

Stable subcentimeter hepatic low-density, likely benign.


   -Cont clear liquids


   -ContIV fluids


   -Dilaudid IV for pain management


   -Repeat lipase pending


   


Transaminitis,  -->227, ALT 99-->219


    -Cont IV fluids 


   -counselled on ETOH abuse


   -recheck CMP in AM


   -Consult GI





Tobacco abuse


   -Counselled encouraged to abstain


   -nicotine patch





DVT prophylaxis SCDs





Written by AMELIE Palencia acting as scribe for  [Carissa] on 4/26/17 at 09:

01. 


This note was transcribed by scribe Vicky KINSEY.  I, Dr. Rozina Ferrer 

personally performed the history, physical exam, and medical decision making; 

and confirmed the accuracy of the information in the transcribed note.





Authenticated by Dr. Rozina Ferrer on 4/26/17 at 09:01.





Problem Qualifiers





(1) Pancreatitis:  


Qualified Code:  K85.90 - Acute pancreatitis, unspecified complication status, 

unspecified pancreatitis type





Vicky Saavedra Apr 26, 2017 09:24


Rozina Ferrer MD Apr 26, 2017 14:32

## 2017-04-26 NOTE — PD.CONS
HPI


History of Present Illness


This is a 27 year old with a history of pancreatitis who presented to the ER 

for evaluation of abdominal pain with associated nausea.  He is alert and 

oriented, but extremely difficult to get any history from because he is easily 

distracted and does not really answer many of the questions that he is asked.  

The patient states that he has had pancreatitis "for awhile" but cannot tell me 

when he had his first episode, what the suspected etiology has been, or when he 

had his last episode.  He reports that he was doing okay up until yesterday 

afternoon around 12pm when he had the sudden onset of severe epigastric pain 

that radiated to his chest.  This is a severe constant dull ache that becomes 

sharp at times.  He has associated nausea.  Initially he did not have any 

actual vomiting, but later he developed associated vomiting, consisting of dark 

brown/black emesis.  He also reports that he has had intermittent fevers for a 

day or two.  He denies any diarrhea, melena, or hematochezia.  He does have GERD

, but can not really tell me how often he has this or if he takes meds for it.  

He reports that he rarely drinks ETOH and last had alcohol 4-5 days ago.  He 

states that he had 2 shots for his birthday, but that he does not usually ever 

drink ETOH.  He reports the only new medications was an unknown pain med that 

his friend gave him for some back pain.  He cannot tell me when this was.   (

Ruba Jung)





PFSH


Past Medical History


HD


Bipolar disorder 


Hx gastritis- does not know if he ever had EGD


Hx chronic pancreatitis


Past Surgical History


Appendectomy


Hernia repair as a baby


Cholecystectomy (Ruba Jung)


Coded Allergies:  


     No Known Allergies (Unverified , 4/25/17)


Medications





 Allergies








Coded Allergies Type Severity Reaction Last Updated Verified


 


  No Known Allergies    4/25/17 No








 Active Scripts








 Medications  Dose


 Route/Sig Days Date Category








Family History


Mother had an MI at age 50


Social History


ETOH occasionally on special occasions, but does not usually drink last drink 2-

3 days ago


Tobacco smokes 1/2 PPD


Occasional marijuana use, cannot tell me the last time.  (Ruba Jung)





Review of Systems


Constitutional:  COMPLAINS OF: Fatigue,  DENIES: Weight loss


Respiratory:  DENIES: Cough


Cardiovascular:  COMPLAINS OF: Chest pain


Gastrointestinal:  COMPLAINS OF: Abdominal pain, Nausea, Vomiting, Heartburn, 

Hematemesis (questionable),  DENIES: Black stools, Bloody stools, Constipation, 

Diarrhea


Integumentary:  COMPLAINS OF: Rash (multiple scabs to bue),  DENIES: Abnormal 

pigmentation


Hematologic/lymphatic:  DENIES: Bruising


Neurologic:  DENIES: Headache


Psychiatric:  DENIES: Confusion (Ruba Jung AMELIE)





GI Exam


Vitals I&O





 Vital Signs








  Date Time  Temp Pulse Resp B/P Pulse Ox O2 Delivery O2 Flow Rate FiO2


 


4/26/17 11:21 98.7 81 18 121/70 98   


 


4/26/17 07:31 97.7 76 20 128/72 99   


 


4/26/17 04:04 97.9 71 21 130/85 97   


 


4/26/17 02:23  60      


 


4/26/17 00:48  72 20 134/80 95   


 


4/25/17 21:39   16     


 


4/25/17 20:51   16     


 


4/25/17 19:45  71 16 120/75 98 Room Air  


 


4/25/17 19:00   16     


 


4/25/17 18:13     98 Room Air  


 


4/25/17 18:13  64 16 125/76 98 Room Air  


 


4/25/17 18:13      Room Air  


 


4/25/17 17:26 97.9 73 15 155/69 100   








Imaging





Last Impressions








Chest X-Ray 4/25/17 1737 Signed





Impressions: 





 Service Date/Time:  Tuesday, April 25, 2017 17:59 - CONCLUSION: No acute 





 disease.       Chau Merino MD 


 


Abdomen/Pelvis CT 4/25/17 0000 Signed





Impressions: 





 Service Date/Time:  Tuesday, April 25, 2017 18:23 - CONCLUSION:  1. Status 

post 





 cholecystectomy. 2. No acute inflammatory process. 3. Stable subcentimeter 





 hepatic low-density, likely benign.     Chau Merino MD 








Laboratory











Test 4/25/17 4/25/17 4/26/17





 17:25 17:52 06:14


 


Prothrombin Time 12.2 SEC  


 


Prothromb Time International 1.1 RATIO  





Ratio   


 


Activated Partial 29.0 SEC  





Thromboplast Time   


 


White Blood Count  9.0 TH/MM3 5.4 TH/MM3


 


Red Blood Count  4.32 MIL/MM3 4.14 MIL/MM3


 


Hemoglobin  13.7 GM/DL 13.2 GM/DL


 


Hematocrit  40.9 % 39.6 %


 


Mean Corpuscular Volume  94.7 FL 95.6 FL


 


Mean Corpuscular Hemoglobin  31.8 PG 31.9 PG


 


Mean Corpuscular Hemoglobin  33.5 % 33.3 %





Concent   


 


Red Cell Distribution Width  13.2 % 13.0 %


 


Platelet Count  146 TH/MM3 127 TH/MM3


 


Mean Platelet Volume  8.3 FL 8.6 FL


 


Neutrophils (%) (Auto)  65.6 % 69.7 %


 


Lymphocytes (%) (Auto)  21.5 % 20.7 %


 


Monocytes (%) (Auto)  12.0 % 8.7 %


 


Eosinophils (%) (Auto)  0.5 % 0.6 %


 


Basophils (%) (Auto)  0.4 % 0.3 %


 


Neutrophils # (Auto)  5.9 TH/MM3 3.8 TH/MM3


 


Lymphocytes # (Auto)  1.9 TH/MM3 1.1 TH/MM3


 


Monocytes # (Auto)  1.1 TH/MM3 0.5 TH/MM3


 


Eosinophils # (Auto)  0.0 TH/MM3 0.0 TH/MM3


 


Basophils # (Auto)  0.0 TH/MM3 0.0 TH/MM3


 


CBC Comment  DIFF FINAL  DIFF FINAL 


 


Differential Comment     


 


Sodium Level  139 MEQ/L 141 MEQ/L


 


Potassium Level  3.8 MEQ/L 3.9 MEQ/L


 


Chloride Level  105 MEQ/L 108 MEQ/L


 


Carbon Dioxide Level  27.5 MEQ/L 23.3 MEQ/L


 


Anion Gap  7 MEQ/L 10 MEQ/L


 


Blood Urea Nitrogen  12 MG/DL 10 MG/DL


 


Creatinine  0.67 MG/DL 0.67 MG/DL


 


Estimat Glomerular Filtration  145 ML/ ML/MIN





Rate   


 


Random Glucose  93 MG/DL 83 MG/DL


 


Calcium Level  8.6 MG/DL 8.4 MG/DL


 


Magnesium Level  2.1 MG/DL 


 


Total Bilirubin  1.4 MG/DL 2.5 MG/DL


 


Aspartate Amino Transf  167 U/L 227 U/L





(AST/SGOT)   


 


Alanine Aminotransferase  99 U/L 219 U/L





(ALT/SGPT)   


 


Alkaline Phosphatase  83 U/L 109 U/L


 


Troponin I  LESS THAN 0.02 





  NG/ML 


 


Total Protein  7.0 GM/DL 6.4 GM/DL


 


Albumin  4.2 GM/DL 3.7 GM/DL


 


Lipase  874 U/L 185 U/L








Physical Examination


HEENT: Normocephalic; atraumatic; no jaundice.


CHEST:  CTA


CARDIAC:  RRR


ABDOMEN:  Soft, nondistended, mild epigastric discomfort; no hepatosplenomegaly

; bowel sounds are present in all four quadrants.


EXTREMITIES: No clubbing, cyanosis, or edema.


SKIN:  Multiple scabs


CNS:  No focal deficits; alert and oriented times three. (Ruba Jung)





Assessment and Plan


Plan


ASSESSMENT.


- Abdominal pain, nausea, vomiting.  There is some vague hx of chronic 

pancreatitis, but patient cannot provide much details regarding


   that- cannot say when it started, last episode, if he ever saw GI.  Abdomen/

Pelvis CT (4/25/17)----> 1. Status post cholecystectomy. 


   2. No acute inflammatory process. 3. Stable subcentimeter hepatic low-density

, likely benign. No leukocytosis.  Lipase was 874 


   on admission, now normalized.    


- Questionable GIB, state he had n/v last night with dark brown/black emesis.  

Not sure if he has any hx of ulcers.    


- Elevated lipase, undetermined significance.  It was mildly elevated on 

admission and now normal.  No evidence of pancreatitis on CT.


- Elevated LFTs.  T. Bili 2.5, , , ALk Phosph 109.  He states 

only drug use is hepatitis, but he does have multiple


   scabs on upper arms and became very defensive when he noticed I was looking 

at these and said "I know it looks like I do drugs, 


   but this is from "[employer]cap[employer]."


   


PLAN:


- EGD in am


- Obtain consents


- Clear liquids


- NPO 


- Add Protonix 


- Hepatitis profile


- Toxicology profile


- CBC, CMP, Lipase in am


- Supportive care


- Further recommendations to follow based on results of above


- Pt seen and examined by Dr. Alegre and myself and this note is written on 

his behalf (Ruba Jung)


Physician Comments


Patient seen and examined


Agree with above


Continue with current supportive care


Monitor labs


Plan for an EGD tomorrow (Elio Alegre MD)








Ruba Jung Apr 26, 2017 14:41


Elio Alegre MD Apr 26, 2017 20:44

## 2017-04-27 VITALS
TEMPERATURE: 98.8 F | SYSTOLIC BLOOD PRESSURE: 126 MMHG | OXYGEN SATURATION: 97 % | HEART RATE: 64 BPM | RESPIRATION RATE: 18 BRPM | DIASTOLIC BLOOD PRESSURE: 70 MMHG

## 2017-04-27 VITALS
RESPIRATION RATE: 18 BRPM | SYSTOLIC BLOOD PRESSURE: 129 MMHG | HEART RATE: 68 BPM | TEMPERATURE: 96 F | DIASTOLIC BLOOD PRESSURE: 82 MMHG | OXYGEN SATURATION: 97 %

## 2017-04-27 VITALS
HEART RATE: 66 BPM | DIASTOLIC BLOOD PRESSURE: 83 MMHG | RESPIRATION RATE: 19 BRPM | TEMPERATURE: 98.3 F | SYSTOLIC BLOOD PRESSURE: 133 MMHG | OXYGEN SATURATION: 97 %

## 2017-04-27 VITALS
OXYGEN SATURATION: 97 % | TEMPERATURE: 98.4 F | HEART RATE: 66 BPM | SYSTOLIC BLOOD PRESSURE: 133 MMHG | RESPIRATION RATE: 18 BRPM | DIASTOLIC BLOOD PRESSURE: 83 MMHG

## 2017-04-27 VITALS
HEART RATE: 63 BPM | OXYGEN SATURATION: 97 % | TEMPERATURE: 98 F | RESPIRATION RATE: 18 BRPM | DIASTOLIC BLOOD PRESSURE: 77 MMHG | SYSTOLIC BLOOD PRESSURE: 136 MMHG

## 2017-04-27 VITALS
OXYGEN SATURATION: 97 % | HEART RATE: 58 BPM | TEMPERATURE: 98.1 F | DIASTOLIC BLOOD PRESSURE: 85 MMHG | SYSTOLIC BLOOD PRESSURE: 141 MMHG | RESPIRATION RATE: 18 BRPM

## 2017-04-27 VITALS — HEART RATE: 76 BPM

## 2017-04-27 VITALS
TEMPERATURE: 98 F | HEART RATE: 63 BPM | SYSTOLIC BLOOD PRESSURE: 136 MMHG | OXYGEN SATURATION: 97 % | RESPIRATION RATE: 18 BRPM | DIASTOLIC BLOOD PRESSURE: 77 MMHG

## 2017-04-27 LAB
ALP SERPL-CCNC: 107 U/L (ref 45–117)
ALP SERPL-CCNC: 98 U/L (ref 45–117)
ALT SERPL-CCNC: 114 U/L (ref 12–78)
ALT SERPL-CCNC: 154 U/L (ref 12–78)
ANION GAP SERPL CALC-SCNC: 6 MEQ/L (ref 5–15)
AST SERPL-CCNC: 35 U/L (ref 15–37)
AST SERPL-CCNC: 64 U/L (ref 15–37)
BASOPHILS # BLD AUTO: 0.1 TH/MM3 (ref 0–0.2)
BASOPHILS NFR BLD: 0.8 % (ref 0–2)
BILIRUB INDIRECT SERPL-MCNC: 0.6 MG/DL (ref 0–0.8)
BILIRUB SERPL-MCNC: 0.9 MG/DL (ref 0.2–1)
BILIRUB SERPL-MCNC: 0.9 MG/DL (ref 0.2–1)
BUN SERPL-MCNC: 7 MG/DL (ref 7–18)
CHLORIDE SERPL-SCNC: 108 MEQ/L (ref 98–107)
EOSINOPHIL # BLD: 0.1 TH/MM3 (ref 0–0.4)
EOSINOPHIL NFR BLD: 1.1 % (ref 0–4)
ERYTHROCYTE [DISTWIDTH] IN BLOOD BY AUTOMATED COUNT: 12.8 % (ref 11.6–17.2)
GFR SERPLBLD BASED ON 1.73 SQ M-ARVRAT: 130 ML/MIN (ref 89–?)
HCO3 BLD-SCNC: 29.7 MEQ/L (ref 21–32)
HCT VFR BLD CALC: 39.8 % (ref 39–51)
HEMO FLAGS: (no result)
LYMPHOCYTES # BLD AUTO: 2 TH/MM3 (ref 1–4.8)
LYMPHOCYTES NFR BLD AUTO: 30.1 % (ref 9–44)
MCH RBC QN AUTO: 31.7 PG (ref 27–34)
MCHC RBC AUTO-ENTMCNC: 33.2 % (ref 32–36)
MCV RBC AUTO: 95.3 FL (ref 80–100)
MONOCYTES NFR BLD: 9 % (ref 0–8)
NEUTROPHILS # BLD AUTO: 4 TH/MM3 (ref 1.8–7.7)
NEUTROPHILS NFR BLD AUTO: 59 % (ref 16–70)
PLATELET # BLD: 149 TH/MM3 (ref 150–450)
POTASSIUM SERPL-SCNC: 4 MEQ/L (ref 3.5–5.1)
RBC # BLD AUTO: 4.18 MIL/MM3 (ref 4.5–5.9)
SODIUM SERPL-SCNC: 144 MEQ/L (ref 136–145)
WBC # BLD AUTO: 6.8 TH/MM3 (ref 4–11)

## 2017-04-27 RX ADMIN — PHENYTOIN SODIUM SCH MLS/HR: 50 INJECTION INTRAMUSCULAR; INTRAVENOUS at 02:33

## 2017-04-27 RX ADMIN — NICOTINE SCH PATCH: 14 PATCH, EXTENDED RELEASE TOPICAL at 07:56

## 2017-04-27 RX ADMIN — PANTOPRAZOLE SODIUM SCH MG: 40 INJECTION, POWDER, FOR SOLUTION INTRAVENOUS at 17:51

## 2017-04-27 RX ADMIN — ONDANSETRON PRN MG: 2 INJECTION, SOLUTION INTRAMUSCULAR; INTRAVENOUS at 17:52

## 2017-04-27 RX ADMIN — DOCUSATE SODIUM SCH MG: 100 CAPSULE, LIQUID FILLED ORAL at 21:52

## 2017-04-27 RX ADMIN — HYDROMORPHONE HYDROCHLORIDE PRN MG: 1 INJECTION, SOLUTION INTRAMUSCULAR; INTRAVENOUS; SUBCUTANEOUS at 04:47

## 2017-04-27 RX ADMIN — HYDROMORPHONE HYDROCHLORIDE PRN MG: 1 INJECTION, SOLUTION INTRAMUSCULAR; INTRAVENOUS; SUBCUTANEOUS at 17:52

## 2017-04-27 RX ADMIN — PHENYTOIN SODIUM SCH MLS/HR: 50 INJECTION INTRAMUSCULAR; INTRAVENOUS at 17:55

## 2017-04-27 RX ADMIN — HYDROMORPHONE HYDROCHLORIDE PRN MG: 1 INJECTION, SOLUTION INTRAMUSCULAR; INTRAVENOUS; SUBCUTANEOUS at 12:05

## 2017-04-27 RX ADMIN — Medication SCH ML: at 07:57

## 2017-04-27 RX ADMIN — PANTOPRAZOLE SODIUM SCH MG: 40 INJECTION, POWDER, FOR SOLUTION INTRAVENOUS at 02:32

## 2017-04-27 RX ADMIN — ONDANSETRON PRN MG: 2 INJECTION, SOLUTION INTRAMUSCULAR; INTRAVENOUS at 08:58

## 2017-04-27 RX ADMIN — ONDANSETRON PRN MG: 2 INJECTION, SOLUTION INTRAMUSCULAR; INTRAVENOUS at 02:32

## 2017-04-27 RX ADMIN — PHENYTOIN SODIUM SCH MLS/HR: 50 INJECTION INTRAMUSCULAR; INTRAVENOUS at 15:57

## 2017-04-27 RX ADMIN — Medication SCH ML: at 21:00

## 2017-04-27 RX ADMIN — HYDROMORPHONE HYDROCHLORIDE PRN MG: 1 INJECTION, SOLUTION INTRAMUSCULAR; INTRAVENOUS; SUBCUTANEOUS at 21:52

## 2017-04-27 RX ADMIN — HYDROMORPHONE HYDROCHLORIDE PRN MG: 1 INJECTION, SOLUTION INTRAMUSCULAR; INTRAVENOUS; SUBCUTANEOUS at 01:29

## 2017-04-27 RX ADMIN — HYDROMORPHONE HYDROCHLORIDE PRN MG: 1 INJECTION, SOLUTION INTRAMUSCULAR; INTRAVENOUS; SUBCUTANEOUS at 07:54

## 2017-04-27 RX ADMIN — PHENYTOIN SODIUM SCH MLS/HR: 50 INJECTION INTRAMUSCULAR; INTRAVENOUS at 07:54

## 2017-04-27 NOTE — PD.PROCEDR
GI Procedure


REFERRING PHYSICIAN


Elzbieta


PROCEDURE PERFORMED


EGD with biopsy





INDICATION FOR PROCEDURE


Abdominal pain nausea and vomiting


PROCEDURE:


The procedure, risks and benefits were discussed with Mr. Valdez and informed


consent was obtained.  Anesthesia sedated him with Diprivan.  He was placed in 

the left lateral decubitus position.





EGD:


The Pentax videoscope was introduced through the oropharynx and advanced to the 

second portion of the duodenum under direct visualization. Retroflexion was 

performed in the stomach.


FINDINGS:


The esophagus this was normal


The stomach there was patchy erythema in the antrum but no ulcerations or 

erosions biopsies were taken for further evaluation


The duodenum this was normal





ESTIMATED BLOOD LOSS:


None


SPECIMENS REMOVED:


Antrum


COMPLICATIONS:


None





IMPRESSION:


Mild gastritis


PLAN:


Await biopsy


Continue current supportive care


Monitor labs


Most likely cause of current symptoms may be adverse reaction to smoking 

marijuana








Elio Alegre MD Apr 27, 2017 16:20

## 2017-04-28 VITALS
RESPIRATION RATE: 18 BRPM | DIASTOLIC BLOOD PRESSURE: 76 MMHG | OXYGEN SATURATION: 97 % | HEART RATE: 84 BPM | TEMPERATURE: 97.8 F | SYSTOLIC BLOOD PRESSURE: 118 MMHG

## 2017-04-28 VITALS
OXYGEN SATURATION: 100 % | SYSTOLIC BLOOD PRESSURE: 114 MMHG | TEMPERATURE: 98.2 F | HEART RATE: 65 BPM | RESPIRATION RATE: 18 BRPM | DIASTOLIC BLOOD PRESSURE: 75 MMHG

## 2017-04-28 VITALS
OXYGEN SATURATION: 97 % | RESPIRATION RATE: 18 BRPM | TEMPERATURE: 98.1 F | SYSTOLIC BLOOD PRESSURE: 128 MMHG | DIASTOLIC BLOOD PRESSURE: 76 MMHG | HEART RATE: 77 BPM

## 2017-04-28 VITALS
TEMPERATURE: 98.1 F | RESPIRATION RATE: 18 BRPM | SYSTOLIC BLOOD PRESSURE: 124 MMHG | DIASTOLIC BLOOD PRESSURE: 72 MMHG | OXYGEN SATURATION: 95 % | HEART RATE: 67 BPM

## 2017-04-28 RX ADMIN — NICOTINE SCH PATCH: 14 PATCH, EXTENDED RELEASE TOPICAL at 08:25

## 2017-04-28 RX ADMIN — ONDANSETRON PRN MG: 2 INJECTION, SOLUTION INTRAMUSCULAR; INTRAVENOUS at 02:02

## 2017-04-28 RX ADMIN — HYDROMORPHONE HYDROCHLORIDE PRN MG: 1 INJECTION, SOLUTION INTRAMUSCULAR; INTRAVENOUS; SUBCUTANEOUS at 02:02

## 2017-04-28 RX ADMIN — Medication SCH ML: at 08:26

## 2017-04-28 RX ADMIN — HYDROMORPHONE HYDROCHLORIDE PRN MG: 1 INJECTION, SOLUTION INTRAMUSCULAR; INTRAVENOUS; SUBCUTANEOUS at 12:35

## 2017-04-28 RX ADMIN — ONDANSETRON PRN MG: 2 INJECTION, SOLUTION INTRAMUSCULAR; INTRAVENOUS at 08:25

## 2017-04-28 RX ADMIN — DOCUSATE SODIUM SCH MG: 100 CAPSULE, LIQUID FILLED ORAL at 08:24

## 2017-04-28 RX ADMIN — PHENYTOIN SODIUM SCH MLS/HR: 50 INJECTION INTRAMUSCULAR; INTRAVENOUS at 12:36

## 2017-04-28 RX ADMIN — PANTOPRAZOLE SODIUM SCH MG: 40 INJECTION, POWDER, FOR SOLUTION INTRAVENOUS at 15:24

## 2017-04-28 RX ADMIN — PANTOPRAZOLE SODIUM SCH MG: 40 INJECTION, POWDER, FOR SOLUTION INTRAVENOUS at 02:01

## 2017-04-28 RX ADMIN — HYDROMORPHONE HYDROCHLORIDE PRN MG: 1 INJECTION, SOLUTION INTRAMUSCULAR; INTRAVENOUS; SUBCUTANEOUS at 08:26

## 2017-04-28 RX ADMIN — SUCRALFATE SCH GM: 1 SUSPENSION ORAL at 12:34

## 2017-04-28 RX ADMIN — SUCRALFATE SCH GM: 1 SUSPENSION ORAL at 15:24

## 2017-04-28 RX ADMIN — PHENYTOIN SODIUM SCH MLS/HR: 50 INJECTION INTRAMUSCULAR; INTRAVENOUS at 02:02

## 2017-07-20 NOTE — HHI.DS
DATE OF SERVICE:  7/20/2017     CHIEF COMPLAINT: Follow up for metastatic breast cancer     PRIMARY CARE PHYSICIAN: Dr. Korin Jauregui DO    HISTORY OF PRESENT ILLNESS:   1. Patient was diagnosed with stage III right breast cancer in August of 2003. Patient was found ER+, OK negative and HER 2 negative. Patient had clinically prominent nodes. She had mastectomy performed at that time. Post-operatively she received 6 months of chemotherapy followed by radiation therapy and adjuvant endocrine therapy with aromatase inhibitors up until 2009.    2. In mid 2015, patient presented for evaluation of dyspnea. November of 2015, patient had a PET scan performed showing metastasis to mediastinum and a right hilar mass. Biopsy revealed this to be metastatic breast cancer, ER+, OK negative, HER2 negative, similar to the original primary breast cancer. Patient was started on treatment with palbociclib/letrozole.   3. 2017, PET scan showed iliac crest progression. She was asymptomatic  4. Patient is under the care of Dr. To, medical oncologist in [place], but needed to establish care locally.    5. Patient is under the care of Dr. Cross for sarcoidosis.  6. Patient is under the care of Dr. Chely MD for headaches    SUBJECTIVE: Patient comes in for follow up. She endorses that she has hot flashes and weakness when she is outdoors in the sun. She has started taking electrolytes, and increased her fluid intake. She notes an increase in her cough, constant. She is under the care of Dr. Richardson, pulmonary. Patient endorses constant drainage, that tickles her throat. She had a bad week last week and this week where she was unable to get out of bed. Her shortness of breath, cough, and respiratory complaints continue. She has been using her emergency inhaler frequently.  She would like to continue with her Lovenox.  Review of systems as noted below.     REVIEW OF SYSTEMS:  Reviewed and verified per nursing assessment as noted  __________________________________________________





Discharge Summary


Admission Date


Apr 25, 2017 at 20:00


Discharge Date:  Apr 28, 2017


Admitting Diagnosis


Pancreatitis





(1) Pancreatitis


ICD Code:  K85.9


Diagnosis:  Principal





(2) Transaminitis


ICD Code:  R74.0


Diagnosis:  Secondary





Procedures


EGD: GI states mild gastritis


Brief History - From Admission


This is a 26 year old male patient with a past medical history which includes 

ADHD, bipolar, gastritis and chronic pancreatitis.  Patient was in his normal 

state of anuja around 11:30 he was walking and ran over the train tracks.  

Bety in the day around 1200pm patient began to have severe epigastric pain 

which radiated to his chest associated with intermitted nausea and dizziness 

without vomiting.  Patient also reports subjective intermitted fevers x 1 day 

with associated hot and cold flashes.  Reports constipated last BM 1.5 days 

ago.  Patient denies dysuria, penile discharge, open sores, shortness of breath

, black tarry stools or BRBPR.  


Patient reports he rarely drinks alcohol last drink was 2-3 days ago.  Patient 

also reports that this feels similar to his prior pancreatitis episodes but 

feels as though this is worse than before.  Pain improved with IV morphine.


CBC/BMP:  


4/27/17 0335                                                                   

             4/27/17 0335





Significant Findings





Laboratory Tests








Test 4/25/17 4/25/17 4/26/17 4/26/17





 17:25 17:52 06:14 15:00


 


Prothrombin Time 12.2 SEC   





 (9.8-11.6)   


 


Red Blood Count  4.32 MIL/MM3 4.14 MIL/MM3 





  (4.50-5.90) (4.50-5.90) 


 


Platelet Count  146 TH/MM3 127 TH/MM3 





  (150-450) (150-450) 


 


Monocytes (%) (Auto)  12.0 % 8.7 % (0.0-8.0) 





  (0.0-8.0)  


 


Monocytes # (Auto)  1.1 TH/MM3  





  (0-0.9)  


 


Total Bilirubin  1.4 MG/DL 2.5 MG/DL 





  (0.2-1.0) (0.2-1.0) 


 


Aspartate Amino Transf  167 U/L (15-37) 227 U/L (15-37) 





(AST/SGOT)    


 


Alanine Aminotransferase  99 U/L (12-78) 219 U/L (12-78) 





(ALT/SGPT)    


 


Troponin I  LESS THAN 0.02  





  NG/ML  





  (0.02-0.05)  


 


Lipase  874 U/L  





  ()  


 


Chloride Level   108 MEQ/L 





   () 


 


Calcium Level   8.4 MG/DL 





   (8.5-10.1) 


 


Urine Cannabinoids Screen    POS  (NEG)


 


    





Test 4/27/17 4/27/17  





 03:35 20:40  


 


Red Blood Count 4.18 MIL/MM3   





 (4.50-5.90)   


 


Platelet Count 149 TH/MM3   





 (150-450)   


 


Monocytes (%) (Auto) 9.0 % (0.0-8.0)   


 


Chloride Level 108 MEQ/L   





 ()   


 


Aspartate Amino Transf 64 U/L (15-37)   





(AST/SGOT)    


 


Alanine Aminotransferase 154 U/L (12-78) 114 U/L (12-78)  





(ALT/SGPT)    


 


Direct Bilirubin  0.3 MG/DL  





  (0.0-0.2)  


 


Total Protein  6.2 GM/DL  





  (6.4-8.2)  








Imaging





Last Impressions








Chest X-Ray 4/25/17 1737 Signed





Impressions: 





 Service Date/Time:  Tuesday, April 25, 2017 17:59 - CONCLUSION: No acute 





 disease.       Chau Merino MD 


 


Abdomen/Pelvis CT 4/25/17 0000 Signed





Impressions: 





 Service Date/Time:  Tuesday, April 25, 2017 18:23 - CONCLUSION:  1. Status 

post 





 cholecystectomy. 2. No acute inflammatory process. 3. Stable subcentimeter 





 hepatic low-density, likely benign.     Chau Merino MD 








PE at Discharge


GENERAL: This is a well-nourished, well-developed patient, in NAD


SKIN: No rashes, ecchymoses or lesions. Cool and dry.


HEAD: Atraumatic. Normocephalic. No temporal or scalp tenderness.


EYES: Extraocular motions intact. No scleral icterus. No injection or drainage. 


CARDIOVASCULAR: Regular rate and rhythm without murmurs, gallops, or rubs. 


RESPIRATORY: Clear to auscultation. Breath sounds equal bilaterally. No wheezes

, rales, or rhonchi.  


GASTROINTESTINAL: Abdomen soft, nontender, nondistended. 


MUSCULOSKELETAL: Extremities without clubbing, cyanosis, or edema. No joint 

tenderness, effusion, or edema noted. No calf tenderness. 


NEUROLOGICAL: Awake and alert.  Motor and sensory grossly within normal limits. 

Five out of 5 muscle strength in all muscle groups.  Normal speech.


Pt update on day of discharge


Patient seen and examined today. Patient is feeling better, he was able to eat 

soft foods this morning. He does complain of a sore throat from the procedure. 

Mouth wash and chloracetic spray ordered for some relief.  Encouraged him to 

eat some foods for a few days. He denies any nausea or vomiting.


Hospital Course


Acute exacerbation of chronic Pancreatitis, elevated lipase 874 on admission, 

now 185


CT abdomen/pelvis: Status post cholecystectomy. No acute inflammatory process. 

Stable subcentimeter hepatic low-density, likely benign.


   -EGD completed: GI states mild gastritis, follow up out patient and refrain 

from marijuana and alcohol


   -Encourage oral fluids


   -Cont Protonix for 30 days outpatient


   -Cont Carafate daily


   -Patient is improving, able to tolerate soft foods. Lipase normalized, will 

need to follow up outpatient with GI





Sore throat, s/p EGD procedure


   -Chloraseptic spray as needed


   -Magic mouth wash AC/HS


   -Encouraged soft foods for a few days 


   


Transaminitis,  -->227-->64-->35, ALT 99-->219-->154-->114


   -counselled on ETOH abuse


   -Follow up with GI outpatient


   -Patient lab values normalized, and will need follow up care for monitoring





Written by AMELIE Palencia acting as scribe for  [Carissa] on 4/28/17 at 10:

20.


Pt Condition on Discharge:  Stable


Discharge Disposition:  Discharge Home


Discharge Time:  > 30 minutes


Discharge Instructions


DIET: Follow Instructions for:  As Tolerated, No Restrictions


Activities you can perform:  Regular-No Restrictions


Follow up Referrals:  


Gastroenterology - 05/12/17 with Elio Alegre MD


PCP Follow-up - 2-3 Days





New Medications:  


Nystatin-Diphenhydramine-Lidocaine Liq (Magic Mouthwash Adult Liq) 120 Ml Susp


5 ML SWISH-SWAL ACHS Each 5mL contains: Nystatin 200,000units, Diphenhydramine 

4.25mg, Viscous Lidocaine 10mg, Cherry syrup 0.8 mL Mouth sores #120 Ref 0 ML


Pantoprazole (Protonix) 40 Mg Tab


40 MG PO DAILY gastritis #30 Ref 0 TAB


Sucralfate Liq (Carafate Liq) 1 Gm/10 Ml Susp


1 GM PO TIDAC on empty stomach Gastritis #300 Ref 0 ML





Additional Information


Biopsy pending, follow up with GI outpatient








Vicky Saavedra Apr 28, 2017 10:34


Rozina Ferrer MD Apr 28, 2017 19:08 CMS/[facility])                         Aggie Collins MD     • nystatin (MYCOSTATIN) [id number] 

## 2018-04-03 ENCOUNTER — HOSPITAL ENCOUNTER (EMERGENCY)
Dept: HOSPITAL 17 - NEPK | Age: 28
Discharge: HOME | End: 2018-04-03
Payer: COMMERCIAL

## 2018-04-03 VITALS
RESPIRATION RATE: 16 BRPM | SYSTOLIC BLOOD PRESSURE: 134 MMHG | HEART RATE: 78 BPM | DIASTOLIC BLOOD PRESSURE: 80 MMHG | TEMPERATURE: 98 F | OXYGEN SATURATION: 100 %

## 2018-04-03 VITALS — BODY MASS INDEX: 22.84 KG/M2 | WEIGHT: 145.51 LBS | HEIGHT: 67 IN

## 2018-04-03 DIAGNOSIS — Z72.0: ICD-10-CM

## 2018-04-03 DIAGNOSIS — F12.90: ICD-10-CM
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
## 2018-04-03 DIAGNOSIS — S89.92XA: Primary | ICD-10-CM

## 2018-04-03 PROCEDURE — 73564 X-RAY EXAM KNEE 4 OR MORE: CPT

## 2018-04-03 PROCEDURE — 99283 EMERGENCY DEPT VISIT LOW MDM: CPT

## 2018-04-03 PROCEDURE — E0113 CRUTCH UNDERARM EACH WOOD: HCPCS
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
## 2018-05-27 ENCOUNTER — HOSPITAL ENCOUNTER (EMERGENCY)
Dept: HOSPITAL 17 - NEPD | Age: 28
Discharge: HOME | End: 2018-05-27
Payer: SELF-PAY

## 2018-05-27 VITALS
SYSTOLIC BLOOD PRESSURE: 142 MMHG | DIASTOLIC BLOOD PRESSURE: 57 MMHG | RESPIRATION RATE: 20 BRPM | OXYGEN SATURATION: 99 % | TEMPERATURE: 98.8 F | HEART RATE: 90 BPM

## 2018-05-27 VITALS — HEIGHT: 67 IN | BODY MASS INDEX: 22.84 KG/M2 | WEIGHT: 145.51 LBS

## 2018-05-27 DIAGNOSIS — S61.212A: ICD-10-CM

## 2018-05-27 DIAGNOSIS — S61.210A: Primary | ICD-10-CM

## 2018-05-27 DIAGNOSIS — Z23: ICD-10-CM
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
## 2018-05-27 PROCEDURE — 90471 IMMUNIZATION ADMIN: CPT

## 2018-05-27 PROCEDURE — 90714 TD VACC NO PRESV 7 YRS+ IM: CPT

## 2018-05-27 PROCEDURE — 12002 RPR S/N/AX/GEN/TRNK2.6-7.5CM: CPT

## 2018-05-27 PROCEDURE — 73130 X-RAY EXAM OF HAND: CPT

## 2018-05-27 NOTE — RADRPT
EXAM DATE:  5/27/2018 3:51 PM EDT

AGE/SEX:        28 years / Male



INDICATIONS:  Pain in right hand, first metacarpal and third digit. Patient stated that his hand went
 through glass today.



CLINICAL DATA:  This is the patient's initial encounter. Patient reports that signs and symptoms have
 been present for 1 day and indicates a pain score of 10/10. 

                                                                          

MEDICAL/SURGICAL HISTORY:       None. None.



COMPARISON:         None.



FINDINGS:  

Bony structures are intact and in normal alignment. Osseous density is normal.  Soft tissues are unre
markable.  No radiopaque foreign bodies seen.   



CONCLUSION: 

Negative examination









Electronically signed by: Elliot Schmidt MD  5/27/2018 3:56 PM EDT

## 2018-05-27 NOTE — PD
Data


Data


Last Documented VS





Vital Signs








  Date Time  Temp Pulse Resp B/P (MAP) Pulse Ox O2 Delivery O2 Flow Rate FiO2


 


5/27/18 14:32 98.8 90 20 142/57 (85) 99   








Orders





 Orders


Acetamin-Hydrocod 325-7.5 Mg (Norco  7.5 (5/27/18 15:30)


Tetanus/Diphtheria Tox Adult (Tetanus/Di (5/27/18 15:30)


Lidocaine 2% Inj (Xylocaine 2% Inj) (5/27/18 15:30)


Hand, Complete (Qfe0xny) (5/27/18 )


Ed Discharge Order (5/27/18 17:09)








MDM


Supervised Visit with ORIN:  Yes


Narrative Course


I, Dr. Duran, have reviewed the advance practice practitioner's documentation 

and am in agreement, met with the patient face to face, made the diagnosis, and 

the medical decision making was done by me.  





*My assessment and Findings: Patient seen and examined by me in addition to 

Annabel Hernandez, 28-year-old male presents emergency department laceration 

chiefly over the volar aspect of his right thumb, there is also small 

laceration of the right index finger.  Patient has full tendon range of motion 

of all fingers in all joints of the upper extremity, he has full abduction of 

the thumb, limited some pain.  Examination laceration shows no foreign body, 

appears that the tendons and bones are not exposed.  Amenable for ER repair and 

then follow-up with a primary care physician or return to ED for suture 

removal.  Stable for discharge after the wound repair


Diagnosis





 Primary Impression:  


 Hand laceration


 Additional Impression:  


 Finger laceration


Referrals:  


Hand Surgeon





***Additional Instruction:  


Follow up with your primary care physician within 2-3 days. 


Keep area clean and dry for 24 hours.


After 24 hours, you may bathe as normal but dry the area thoroughly.


You may use over-the-counter triple antibiotic ointments for your injury daily.


Change dressings daily.


If bleeding starts, apply pressure and elevate the area.


If you developed increased redness, swelling, or pain return to the emergency 

department as this could be a sign of infection.


Scripts


Ibuprofen (Ibuprofen) 800 Mg Tab


800 MG PO Q8H Y for Pain/Inflammation for 5 Days, #15 TAB 0 Refills


   Prov: Taiwo Duran MD         5/27/18 


Cephalexin (Keflex) 500 Mg Cap


500 MG PO Q8H for Infection for 7 Days, #21 CAP 0 Refills


   Prov: Taiwo Duran MD         5/27/18


Disposition:  01 DISCHARGE HOME


Condition:  Stable











Taiwo Duran MD May 27, 2018 16:24
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
## 2019-09-21 NOTE — RADRPT
EXAM DATE/TIME:  04/25/2017 17:59 

 

[facility] COMPARISON:     

CHEST SINGLE AP, September 19, 2014, 19:47.

 

                     

INDICATIONS :     

Chest pain.

                     

 

MEDICAL HISTORY :     

None.          

 

SURGICAL HISTORY :     

None.   

 

ENCOUNTER:     

Initial                                        

 

ACUITY:     

1 day      

 

PAIN SCORE:     

10/10

 

LOCATION:     

Bilateral  chest

 

FINDINGS:     

A single view of the chest demonstrates the lungs to be symmetrically aerated without evidence of mas
s, infiltrate or effusion.  The cardiomediastinal contours are unremarkable.  Osseous structures are 
intact.

 

CONCLUSION:     No acute disease.  

 

 

 

 Chau Merino MD on April 25, 2017 at 18:08           

Board Certified Radiologist.

 This report was verified electronically.
EXAM DATE/TIME:  04/25/2017 18:23 

 

[facility] COMPARISON:     

CT ABDOMEN & PELVIS W CONTRAST, January 28, 2017, 14:57.

 

 

INDICATIONS :     

Chest and abdomen pain with nausea.

                      

 

IV CONTRAST:     

70 cc Omnipaque 350 (iohexol) IV 

 

 

ORAL CONTRAST:      

No oral contrast ingested.

                      

 

RADIATION DOSE:     

9.96 CTDIvol (mGy) 

 

 

MEDICAL HISTORY :     

Pancreatitis.  

 

SURGICAL HISTORY :      

Appendectomy. Cholecystectomy.

 

ENCOUNTER:      

Initial

 

ACUITY:      

1 day

 

PAIN SCALE:      

7/10

 

LOCATION:       

Bilateral  upper abdomen

 

TECHNIQUE:     

Volumetric scanning of the abdomen and pelvis was performed.  Using automated exposure control and ad
justment of the mA and/or kV according to patient size, radiation dose was kept as low as reasonably 
achievable to obtain optimal diagnostic quality images. 

 

FINDINGS:     

 

LOWER LUNGS:     

The visualized lower lungs are clear.

 

LIVER:     

Homogeneous density without lesion.  There is mild dilation of the biliary tree.  Status post cholecy
stectomy. Stable subcentimeter low density.

 

SPLEEN:     

Normal size without lesion.

 

PANCREAS:     

Within normal limits.

 

KIDNEYS:     

Normal in size and shape.  There is no mass, stone or hydronephrosis.

 

ADRENAL GLANDS:     

Within normal limits.

 

VASCULAR:     

There is no aortic aneurysm.

 

BOWEL/MESENTERY:     

The stomach, small bowel, and colon demonstrate no acute abnormality.  There is no free intraperitone
al air or fluid.

 

ABDOMINAL WALL:     

Within normal limits.

 

RETROPERITONEUM:     

There is no lymphadenopathy. 

 

BLADDER:     

No wall thickening or mass. 

 

REPRODUCTIVE:     

Within normal limits.

 

INGUINAL:     

There is no lymphadenopathy or hernia. 

 

MUSCULOSKELETAL:     

Within normal limits for patient age. 

 

CONCLUSION:     

1. Status post cholecystectomy.

2. No acute inflammatory process.

3. Stable subcentimeter hepatic low-density, likely benign.

 

 

 

 Chau Merino MD on April 25, 2017 at 18:43           

Board Certified Radiologist.

 This report was verified electronically.
other